# Patient Record
Sex: FEMALE | URBAN - METROPOLITAN AREA
[De-identification: names, ages, dates, MRNs, and addresses within clinical notes are randomized per-mention and may not be internally consistent; named-entity substitution may affect disease eponyms.]

---

## 2022-11-01 ENCOUNTER — APPOINTMENT (OUTPATIENT)
Dept: URBAN - METROPOLITAN AREA CLINIC 261 | Age: 22
Setting detail: DERMATOLOGY
End: 2022-11-01

## 2022-11-01 DIAGNOSIS — R59.0 LOCALIZED ENLARGED LYMPH NODES: ICD-10-CM

## 2022-11-01 PROCEDURE — 99202 OFFICE O/P NEW SF 15 MIN: CPT

## 2022-11-01 PROCEDURE — OTHER ADDITIONAL NOTES: OTHER

## 2022-11-01 PROCEDURE — OTHER OBSERVATION: OTHER

## 2022-11-01 PROCEDURE — OTHER COUNSELING: OTHER

## 2022-11-01 PROCEDURE — OTHER MIPS QUALITY: OTHER

## 2022-11-01 PROCEDURE — OTHER OBSERVATION AND MEASURE: OTHER

## 2022-11-01 ASSESSMENT — LOCATION SIMPLE DESCRIPTION DERM: LOCATION SIMPLE: POSTERIOR SCALP

## 2022-11-01 ASSESSMENT — LOCATION DETAILED DESCRIPTION DERM: LOCATION DETAILED: RIGHT INFERIOR OCCIPITAL SCALP

## 2022-11-01 ASSESSMENT — LOCATION ZONE DERM: LOCATION ZONE: SCALP

## 2022-11-01 NOTE — PROCEDURE: ADDITIONAL NOTES
Additional Notes: Refer to [facility] in [place]. Additional Notes: Refer to [facility] in [place].

## 2022-11-01 NOTE — HPI: SKIN LESION
What Type Of Note Output Would You Prefer (Optional)?: Bullet Format
Has Your Skin Lesion Been Treated?: not been treated
Is This A New Presentation, Or A Follow-Up?: Skin Lesion
Additional History: States the lesion is causing headaches and blurry vision.